# Patient Record
Sex: MALE | Race: WHITE | NOT HISPANIC OR LATINO | Employment: FULL TIME | URBAN - METROPOLITAN AREA
[De-identification: names, ages, dates, MRNs, and addresses within clinical notes are randomized per-mention and may not be internally consistent; named-entity substitution may affect disease eponyms.]

---

## 2021-03-23 LAB — SARS-COV-2 RNA RESP QL NAA+PROBE: POSITIVE

## 2022-04-10 ENCOUNTER — HISTORICAL (OUTPATIENT)
Dept: ADMINISTRATIVE | Facility: HOSPITAL | Age: 57
End: 2022-04-10

## 2022-04-29 VITALS
HEIGHT: 69 IN | BODY MASS INDEX: 29.18 KG/M2 | WEIGHT: 197 LBS | OXYGEN SATURATION: 96 % | SYSTOLIC BLOOD PRESSURE: 118 MMHG | DIASTOLIC BLOOD PRESSURE: 76 MMHG

## 2022-07-17 ENCOUNTER — OFFICE VISIT (OUTPATIENT)
Dept: URGENT CARE | Facility: CLINIC | Age: 57
End: 2022-07-17
Payer: MEDICARE

## 2022-07-17 VITALS
BODY MASS INDEX: 29.18 KG/M2 | WEIGHT: 197 LBS | RESPIRATION RATE: 20 BRPM | OXYGEN SATURATION: 98 % | HEIGHT: 69 IN | TEMPERATURE: 98 F | SYSTOLIC BLOOD PRESSURE: 140 MMHG | DIASTOLIC BLOOD PRESSURE: 89 MMHG | HEART RATE: 78 BPM

## 2022-07-17 DIAGNOSIS — Z20.822 EXPOSURE TO COVID-19 VIRUS: ICD-10-CM

## 2022-07-17 DIAGNOSIS — R05.9 COUGH: Primary | ICD-10-CM

## 2022-07-17 DIAGNOSIS — R53.83 FATIGUE, UNSPECIFIED TYPE: ICD-10-CM

## 2022-07-17 DIAGNOSIS — U07.1 COVID-19: ICD-10-CM

## 2022-07-17 LAB
CTP QC/QA: YES
SARS-COV-2 RDRP RESP QL NAA+PROBE: POSITIVE

## 2022-07-17 PROCEDURE — 99212 PR OFFICE/OUTPT VISIT, EST, LEVL II, 10-19 MIN: ICD-10-PCS | Mod: CR,,, | Performed by: NURSE PRACTITIONER

## 2022-07-17 PROCEDURE — U0002 COVID-19 LAB TEST NON-CDC: HCPCS | Mod: QW,CR,, | Performed by: NURSE PRACTITIONER

## 2022-07-17 PROCEDURE — U0002: ICD-10-PCS | Mod: QW,CR,, | Performed by: NURSE PRACTITIONER

## 2022-07-17 PROCEDURE — 99212 OFFICE O/P EST SF 10 MIN: CPT | Mod: CR,,, | Performed by: NURSE PRACTITIONER

## 2022-07-17 RX ORDER — LISDEXAMFETAMINE DIMESYLATE 70 MG/1
50 CAPSULE ORAL DAILY
COMMUNITY
Start: 2022-07-08

## 2022-07-17 RX ORDER — BUPROPION HYDROCHLORIDE 300 MG/1
300 TABLET ORAL DAILY
COMMUNITY
Start: 2022-06-22

## 2022-07-17 RX ORDER — ALBUTEROL SULFATE 90 UG/1
2 AEROSOL, METERED RESPIRATORY (INHALATION) EVERY 6 HOURS PRN
COMMUNITY

## 2022-07-17 NOTE — PROGRESS NOTES
"Subjective:       Patient ID: Malachi Kimble is a 57 y.o. male.    Vitals:  height is 5' 9" (1.753 m) and weight is 89.4 kg (197 lb). His oral temperature is 98.3 °F (36.8 °C). His blood pressure is 140/89 (abnormal) and his pulse is 78. His respiration is 20 and oxygen saturation is 98%.     Chief Complaint: Cough (Cough, fatigue, nasal congestion, runny nose, watery eyes, x 2 days, wife positive for COVID)    Cough, fatigue, nasal congestion, runny nose, watery eyes, x 2 days, wife positive for COVID      Cough  This is a new problem. The current episode started in the past 7 days. The problem has been gradually worsening. The cough is non-productive. Associated symptoms include headaches, nasal congestion and rhinorrhea. Treatments tried: vitamin C & D, Zinc, nyquil. The treatment provided mild relief. His past medical history is significant for asthma.       Respiratory: Positive for cough.    Neurological: Positive for headaches.       Objective:      Physical Exam   Constitutional: He is oriented to person, place, and time. He appears well-developed. He is cooperative.  Non-toxic appearance. He does not appear ill. No distress.   HENT:   Head: Normocephalic and atraumatic.   Ears:   Right Ear: Hearing, tympanic membrane, external ear and ear canal normal.   Left Ear: Hearing, tympanic membrane, external ear and ear canal normal.   Nose: Nose normal. No mucosal edema, rhinorrhea or nasal deformity. No epistaxis. Right sinus exhibits no maxillary sinus tenderness and no frontal sinus tenderness. Left sinus exhibits no maxillary sinus tenderness and no frontal sinus tenderness.   Mouth/Throat: Uvula is midline, oropharynx is clear and moist and mucous membranes are normal. No trismus in the jaw. Normal dentition. No uvula swelling. No oropharyngeal exudate, posterior oropharyngeal edema or posterior oropharyngeal erythema.   Eyes: Conjunctivae and lids are normal. No scleral icterus.   Neck: Trachea normal and " phonation normal. Neck supple. No edema present. No erythema present. No neck rigidity present.   Cardiovascular: Normal rate, regular rhythm, normal heart sounds and normal pulses.   Pulmonary/Chest: Effort normal and breath sounds normal. No respiratory distress. He has no decreased breath sounds. He has no rhonchi.   Abdominal: Normal appearance.   Musculoskeletal: Normal range of motion.         General: No deformity. Normal range of motion.   Neurological: He is alert and oriented to person, place, and time. He exhibits normal muscle tone. Coordination normal.   Skin: Skin is warm, dry, intact, not diaphoretic and not pale.   Psychiatric: His speech is normal and behavior is normal. Judgment and thought content normal.   Nursing note and vitals reviewed.        Assessment:       1. Cough    2. Fatigue, unspecified type    3. Exposure to COVID-19 virus    4. COVID-19          Plan:     Take Mucinex as directed for cough.    Increase oral fluids, take daily vitamins as directed, Self quarantine for 5 days.    Go to ER for worsening symptoms including shortness of breath, fever, or worsening symptoms.      Cough  -     POCT COVID-19 Rapid Screening    Fatigue, unspecified type  -     POCT COVID-19 Rapid Screening    Exposure to COVID-19 virus  -     POCT COVID-19 Rapid Screening    COVID-19

## 2022-07-17 NOTE — PATIENT INSTRUCTIONS
Take Mucinex as directed for cough.    Increase oral fluids, take daily vitamins as directed, Self quarantine for 5 days.    Go to ER for worsening symptoms including shortness of breath, fever, or worsening symptoms.     POSITIVE COVID TEST    You have tested positive for COVID-19 today.  Please note that patients who test positive for COVID-19 are required by the CDC to undergo isolation for 5 days     However, if you are asymptomatic (a person who does not have any symptoms) and COVID-19 positive, your 5-day isolation begins on the day you tested positive, regardless of exposure date.    Also, per the CDC guidelines, once your 5 days have passed, and you have not had fever greater than 100.4F in the last 24 hours without taking any fever reducers such as Tylenol (Acetaminophen) or Motrin (Ibuprofen), you may return to your normal activities including social distancing, wearing masks, and frequent handwashing - YOU DO NOT NEED ANOTHER TEST IN ORDER TO END YOUR QUARANTINE.

## 2022-09-21 ENCOUNTER — HISTORICAL (OUTPATIENT)
Dept: ADMINISTRATIVE | Facility: HOSPITAL | Age: 57
End: 2022-09-21
Payer: MEDICARE

## 2023-12-10 ENCOUNTER — OFFICE VISIT (OUTPATIENT)
Dept: URGENT CARE | Facility: CLINIC | Age: 58
End: 2023-12-10
Payer: MEDICARE

## 2023-12-10 VITALS
BODY MASS INDEX: 29.18 KG/M2 | HEART RATE: 92 BPM | SYSTOLIC BLOOD PRESSURE: 162 MMHG | DIASTOLIC BLOOD PRESSURE: 91 MMHG | WEIGHT: 197 LBS | OXYGEN SATURATION: 97 % | HEIGHT: 69 IN | RESPIRATION RATE: 20 BRPM | TEMPERATURE: 98 F

## 2023-12-10 DIAGNOSIS — J32.9 BACTERIAL SINUSITIS: ICD-10-CM

## 2023-12-10 DIAGNOSIS — R05.9 COUGH, UNSPECIFIED TYPE: Primary | ICD-10-CM

## 2023-12-10 DIAGNOSIS — B96.89 BACTERIAL SINUSITIS: ICD-10-CM

## 2023-12-10 LAB
CTP QC/QA: YES
SARS-COV-2 RDRP RESP QL NAA+PROBE: NEGATIVE

## 2023-12-10 PROCEDURE — 87635: ICD-10-PCS | Mod: QW,,, | Performed by: FAMILY MEDICINE

## 2023-12-10 PROCEDURE — 96372 PR INJECTION,THERAP/PROPH/DIAG2ST, IM OR SUBCUT: ICD-10-PCS | Mod: ,,, | Performed by: FAMILY MEDICINE

## 2023-12-10 PROCEDURE — 87635 SARS-COV-2 COVID-19 AMP PRB: CPT | Mod: QW,,, | Performed by: FAMILY MEDICINE

## 2023-12-10 PROCEDURE — 99213 PR OFFICE/OUTPT VISIT, EST, LEVL III, 20-29 MIN: ICD-10-PCS | Mod: 25,,, | Performed by: FAMILY MEDICINE

## 2023-12-10 PROCEDURE — 99213 OFFICE O/P EST LOW 20 MIN: CPT | Mod: 25,,, | Performed by: FAMILY MEDICINE

## 2023-12-10 PROCEDURE — 96372 THER/PROPH/DIAG INJ SC/IM: CPT | Mod: ,,, | Performed by: FAMILY MEDICINE

## 2023-12-10 RX ORDER — DEXTROAMPHETAMINE SACCHARATE, AMPHETAMINE ASPARTATE MONOHYDRATE, DEXTROAMPHETAMINE SULFATE AND AMPHETAMINE SULFATE 7.5; 7.5; 7.5; 7.5 MG/1; MG/1; MG/1; MG/1
CAPSULE, EXTENDED RELEASE ORAL EVERY MORNING
COMMUNITY
Start: 2023-11-16

## 2023-12-10 RX ORDER — DEXAMETHASONE SODIUM PHOSPHATE 100 MG/10ML
10 INJECTION INTRAMUSCULAR; INTRAVENOUS ONCE
Status: COMPLETED | OUTPATIENT
Start: 2023-12-10 | End: 2023-12-10

## 2023-12-10 RX ORDER — DOXYCYCLINE HYCLATE 100 MG
100 TABLET ORAL 2 TIMES DAILY
Qty: 10 TABLET | Refills: 0 | Status: SHIPPED | OUTPATIENT
Start: 2023-12-10 | End: 2023-12-15

## 2023-12-10 RX ORDER — BUSPIRONE HYDROCHLORIDE 15 MG/1
15 TABLET ORAL 3 TIMES DAILY
COMMUNITY

## 2023-12-10 RX ADMIN — DEXAMETHASONE SODIUM PHOSPHATE 10 MG: 100 INJECTION INTRAMUSCULAR; INTRAVENOUS at 11:12

## 2023-12-10 NOTE — PATIENT INSTRUCTIONS
Recommend over-the-counter saline nasal spray followed by over-the-counter steroid nasal spray    Take doxycycline twice daily for 5 days    Drink plenty of fluids.      Get plenty of rest.      Tylenol or Motrin as needed.      Go to the ER with any significant change or worsening of symptoms.

## 2025-06-14 ENCOUNTER — OFFICE VISIT (OUTPATIENT)
Dept: URGENT CARE | Facility: CLINIC | Age: 60
End: 2025-06-14
Payer: MEDICARE

## 2025-06-14 ENCOUNTER — RESULTS FOLLOW-UP (OUTPATIENT)
Dept: URGENT CARE | Facility: CLINIC | Age: 60
End: 2025-06-14

## 2025-06-14 VITALS
DIASTOLIC BLOOD PRESSURE: 83 MMHG | TEMPERATURE: 98 F | SYSTOLIC BLOOD PRESSURE: 169 MMHG | HEIGHT: 69 IN | BODY MASS INDEX: 29.18 KG/M2 | OXYGEN SATURATION: 97 % | HEART RATE: 92 BPM | WEIGHT: 197 LBS | RESPIRATION RATE: 18 BRPM

## 2025-06-14 DIAGNOSIS — J45.21 MILD INTERMITTENT ASTHMA WITH ACUTE EXACERBATION: Primary | ICD-10-CM

## 2025-06-14 DIAGNOSIS — R06.2 WHEEZING ON EXHALATION: ICD-10-CM

## 2025-06-14 PROCEDURE — 99214 OFFICE O/P EST MOD 30 MIN: CPT | Mod: 25,,, | Performed by: NURSE PRACTITIONER

## 2025-06-14 PROCEDURE — 96372 THER/PROPH/DIAG INJ SC/IM: CPT | Mod: ,,, | Performed by: NURSE PRACTITIONER

## 2025-06-14 RX ORDER — DEXAMETHASONE SODIUM PHOSPHATE 10 MG/ML
10 INJECTION INTRAMUSCULAR; INTRAVENOUS
Status: COMPLETED | OUTPATIENT
Start: 2025-06-14 | End: 2025-06-14

## 2025-06-14 RX ORDER — MONTELUKAST SODIUM 10 MG/1
10 TABLET ORAL NIGHTLY
Qty: 30 TABLET | Refills: 0 | Status: SHIPPED | OUTPATIENT
Start: 2025-06-14 | End: 2025-07-14

## 2025-06-14 RX ORDER — PREDNISONE 10 MG/1
30 TABLET ORAL DAILY
Qty: 15 TABLET | Refills: 0 | Status: SHIPPED | OUTPATIENT
Start: 2025-06-14 | End: 2025-06-19

## 2025-06-14 RX ADMIN — DEXAMETHASONE SODIUM PHOSPHATE 10 MG: 10 INJECTION INTRAMUSCULAR; INTRAVENOUS at 08:06

## 2025-06-14 NOTE — PROGRESS NOTES
"Subjective:      Patient ID: Malachi Kimble is a 60 y.o. male.    Vitals:  height is 5' 9" (1.753 m) and weight is 89.4 kg (197 lb). His oral temperature is 98.2 °F (36.8 °C). His blood pressure is 169/83 (abnormal) and his pulse is 92. His respiration is 18 and oxygen saturation is 97%.     Chief Complaint: Wheezing     Patient is a 60 y.o. male who presents to urgent care with complaints of  asthma flare up, wheezing, worse at night when lying down, x2 weeks. Alleviating factors include Albuterol inh with no relief.  Patient states he has not had a asthma attack in years.  Patient states he is puzzled why it started over the last 2 weeks.  Patient denies congestion, headache, sore throat, fever, n/v/d.        Constitution: Negative.   HENT: Negative.     Neck: neck negative.   Cardiovascular: Negative.  Negative for chest pain.   Eyes: Negative.    Respiratory:  Positive for cough, shortness of breath, wheezing and asthma. Negative for sleep apnea, chest tightness, sputum production, bloody sputum, COPD and stridor.    Endocrine: negative.   Genitourinary: Negative.    Musculoskeletal: Negative.    Skin: Negative.    Allergic/Immunologic: Positive for asthma.   Neurological: Negative.    Hematologic/Lymphatic: Negative.    Psychiatric/Behavioral: Negative.        Objective:     Physical Exam   Constitutional: He is oriented to person, place, and time. He appears well-developed. He is cooperative. He does not appear ill.   HENT:   Head: Normocephalic and atraumatic.   Ears:   Right Ear: Hearing, tympanic membrane, external ear and ear canal normal.   Left Ear: Hearing, tympanic membrane, external ear and ear canal normal.   Nose: Nose normal. No mucosal edema, rhinorrhea, nasal deformity or congestion. No epistaxis. Right sinus exhibits no maxillary sinus tenderness and no frontal sinus tenderness. Left sinus exhibits no maxillary sinus tenderness and no frontal sinus tenderness.   Mouth/Throat: Uvula is midline, " oropharynx is clear and moist and mucous membranes are normal. Mucous membranes are moist. No trismus in the jaw. Normal dentition. No uvula swelling. No oropharyngeal exudate or posterior oropharyngeal erythema.   Eyes: Conjunctivae and lids are normal.   Neck: Trachea normal and phonation normal. Neck supple.   Cardiovascular: Normal rate, regular rhythm, normal heart sounds and normal pulses.   Pulmonary/Chest: Effort normal. No respiratory distress. He has wheezes (Mild expiratory wheezing).   Abdominal: Normal appearance and bowel sounds are normal. Soft.   Musculoskeletal: Normal range of motion.         General: Normal range of motion.   Lymphadenopathy:     He has no cervical adenopathy.   Neurological: no focal deficit. He is alert and oriented to person, place, and time. He exhibits normal muscle tone.   Skin: Skin is warm, dry and intact. Capillary refill takes less than 2 seconds.   Psychiatric: His speech is normal and behavior is normal. Judgment and thought content normal.   Nursing note and vitals reviewed.    EXAMINATION:  XR CHEST PA AND LATERAL     CLINICAL HISTORY:  Mild intermittent asthma with (acute) exacerbation     TECHNIQUE:  PA and lateral chest radiographs     COMPARISON:  None.     FINDINGS:  The heart is normal in size.  The lungs are clear.  There is no pleural effusion or visible pneumothorax.     Impression:     No acute abnormality of the chest.        Electronically signed by:Marya Cisse  Date:                                            06/14/2025  Time:                                           08:44  Assessment:     1. Mild intermittent asthma with acute exacerbation    2. Wheezing on exhalation        Plan:   Instructions:  Patient has rescue inhaler on person.  Take rescue inhaler as prescribed  Take prescription medication as prescribed  Report to emergency room if symptoms persist or worsen      Asthma makes it hard for you to breathe. During an asthma attack, the  airways swell and narrow. Severe asthma attacks can be dangerous, but you can usually prevent them. Controlling asthma and treating symptoms before they get bad can help you avoid bad attacks. You may also avoid future trips to the doctor.  Follow-up care is a key part of your treatment and safety. Be sure to make and go to all appointments, and call your doctor or nurse advice line (544 in most provinces and [place]) if you are having problems. It's also a good idea to know your test results and keep a list of the medicines you take.    How can you care for yourself at home?  Follow your Asthma Action Plan so you can manage your symptoms at home. An Asthma Action Plan will help you prevent and control airway reactions and will tell you what to do during an asthma attack. If you do not have an Asthma Action Plan, work with your doctor to build one.  Take your asthma medicine exactly as prescribed. Medicine plays an important role in controlling asthma. Talk to your doctor right away if you have any questions about what to take and how to take it.  Use your quick-relief (rescue) medicine when you have symptoms of an asthma attack. Some people need to use quick-relief medicine before they exercise to prevent asthma symptoms. Salbutamol is a quick-relief medicine that is often used. In some cases, a certain type of controller inhaler is used as a quick-relief medicine. Ask your doctor what to use for quick relief.  Take your controller (preventer) every day, not just when you have symptoms. Controller medicine usually includes an inhaled corticosteroid. The goal is to prevent problems before they occur.  If your doctor prescribed corticosteroid pills to use during an attack, take them as directed. They may take hours to work, but they may shorten the attack and help you breathe better.  Keep your quick-relief medicine with you at all times.  Talk to your doctor before using other medicines. Some medicines, such as  aspirin, can cause asthma attacks in some people.  Check yourself for asthma symptoms to know which step to follow in your Asthma Action Plan. Watch for things like coughing, wheezing, being short of breath, and feeling tightness in your chest. Also notice if symptoms wake you up at night or if you get tired quickly when you exercise.  If you have a peak flow meter, use it to check for changes in your breathing. This can help you predict when an asthma attack is going to occur. Take your medicine as recommended in your Asthma Action Plan to prevent asthma attacks or make them less severe.  Talk to your doctor about making regular appointments. These visits will help you learn more about asthma and what you can do to control it. Your doctor will monitor your treatment to make sure the medicine is helping you. You should see your doctor about your asthma once or twice a year.  Keep track of your asthma attacks and your treatment. After you have had an attack, write down what triggered it, what helped end it, and any concerns you have about your Asthma Action Plan. Take your diary when you see your doctor. You can then review your Asthma Action Plan and decide if it is working.  Do not smoke or vape, or allow others to smoke or vape around you. Avoid places where smoking and vaping are allowed. Smoking makes asthma worse. If you need help quitting, talk to your doctor about stop-smoking programs and medicines. These can increase your chances of quitting for good.  Avoid smoke from forest fires.  Learn what triggers an asthma attack for you, and avoid the triggers when you can. Common triggers include colds, smoke, air pollution, dust, pollen, mould (including snow mould), pets, cockroaches, stress and other strong emotions, and cold air.  Wash your hands often. Talk to your doctor about getting a pneumococcal vaccine. If you have had one before, ask your doctor if you need a second dose. Stay up to date on your  COVID-19 vaccines.      When should you call for help?    Call 911 anytime you think you may need emergency care. For example, call if:  You have severe trouble breathing.  Call your doctor or nurse advice line (811 in most provinces and [place]) now or seek immediate medical care if:  Your symptoms do not get better after you have followed your asthma action plan.  You cough up yellow, dark brown, or bloody mucus (sputum).  Watch closely for changes in your health, and be sure to contact your doctor or nurse advice line if:  Your coughing or wheezing get worse.  You need to use quick-relief medicine on more than 2 days a week (unless it is just for exercise). Activate your Asthma Action Plan.  You need help figuring out what is triggering your asthma attacks.  You have other symptoms that are causing you difficulty in managing your asthma.     Mild intermittent asthma with acute exacerbation  -     dexAMETHasone injection 10 mg  -     XR CHEST PA AND LATERAL; Future; Expected date: 06/14/2025  -     montelukast (SINGULAIR) 10 mg tablet; Take 1 tablet (10 mg total) by mouth every evening.  Dispense: 30 tablet; Refill: 0  -     predniSONE (DELTASONE) 10 MG tablet; Take 3 tablets (30 mg total) by mouth once daily. for 5 days  Dispense: 15 tablet; Refill: 0  -     Ambulatory referral/consult to Pulmonology    Wheezing on exhalation  -     dexAMETHasone injection 10 mg  -     XR CHEST PA AND LATERAL; Future; Expected date: 06/14/2025  -     montelukast (SINGULAIR) 10 mg tablet; Take 1 tablet (10 mg total) by mouth every evening.  Dispense: 30 tablet; Refill: 0  -     predniSONE (DELTASONE) 10 MG tablet; Take 3 tablets (30 mg total) by mouth once daily. for 5 days  Dispense: 15 tablet; Refill: 0  -     Ambulatory referral/consult to Pulmonology             Additional MDM:     Heart Failure Score:   COPD = No